# Patient Record
Sex: MALE | Race: WHITE | URBAN - METROPOLITAN AREA
[De-identification: names, ages, dates, MRNs, and addresses within clinical notes are randomized per-mention and may not be internally consistent; named-entity substitution may affect disease eponyms.]

---

## 2019-04-08 ENCOUNTER — OUTPATIENT (OUTPATIENT)
Dept: OUTPATIENT SERVICES | Facility: HOSPITAL | Age: 49
LOS: 1 days | Discharge: HOME | End: 2019-04-08

## 2019-04-08 DIAGNOSIS — N40.0 BENIGN PROSTATIC HYPERPLASIA WITHOUT LOWER URINARY TRACT SYMPTOMS: ICD-10-CM

## 2019-04-08 DIAGNOSIS — E78.00 PURE HYPERCHOLESTEROLEMIA, UNSPECIFIED: ICD-10-CM

## 2019-04-08 DIAGNOSIS — I10 ESSENTIAL (PRIMARY) HYPERTENSION: ICD-10-CM

## 2019-04-08 DIAGNOSIS — Z00.00 ENCOUNTER FOR GENERAL ADULT MEDICAL EXAMINATION WITHOUT ABNORMAL FINDINGS: ICD-10-CM

## 2020-12-22 ENCOUNTER — TRANSCRIPTION ENCOUNTER (OUTPATIENT)
Age: 50
End: 2020-12-22

## 2021-03-25 ENCOUNTER — EMERGENCY (EMERGENCY)
Facility: HOSPITAL | Age: 51
LOS: 0 days | Discharge: HOME | End: 2021-03-25
Attending: STUDENT IN AN ORGANIZED HEALTH CARE EDUCATION/TRAINING PROGRAM
Payer: COMMERCIAL

## 2021-03-25 VITALS
TEMPERATURE: 97 F | SYSTOLIC BLOOD PRESSURE: 185 MMHG | HEART RATE: 65 BPM | HEIGHT: 75 IN | RESPIRATION RATE: 20 BRPM | DIASTOLIC BLOOD PRESSURE: 101 MMHG | WEIGHT: 235.01 LBS | OXYGEN SATURATION: 97 %

## 2021-03-25 VITALS
SYSTOLIC BLOOD PRESSURE: 160 MMHG | OXYGEN SATURATION: 96 % | DIASTOLIC BLOOD PRESSURE: 99 MMHG | RESPIRATION RATE: 20 BRPM | TEMPERATURE: 98 F | HEART RATE: 62 BPM

## 2021-03-25 DIAGNOSIS — K21.9 GASTRO-ESOPHAGEAL REFLUX DISEASE WITHOUT ESOPHAGITIS: ICD-10-CM

## 2021-03-25 DIAGNOSIS — R07.89 OTHER CHEST PAIN: ICD-10-CM

## 2021-03-25 DIAGNOSIS — I10 ESSENTIAL (PRIMARY) HYPERTENSION: ICD-10-CM

## 2021-03-25 DIAGNOSIS — Z20.822 CONTACT WITH AND (SUSPECTED) EXPOSURE TO COVID-19: ICD-10-CM

## 2021-03-25 DIAGNOSIS — E78.5 HYPERLIPIDEMIA, UNSPECIFIED: ICD-10-CM

## 2021-03-25 DIAGNOSIS — R07.9 CHEST PAIN, UNSPECIFIED: ICD-10-CM

## 2021-03-25 LAB
ALBUMIN SERPL ELPH-MCNC: 4.5 G/DL — SIGNIFICANT CHANGE UP (ref 3.5–5.2)
ALP SERPL-CCNC: 90 U/L — SIGNIFICANT CHANGE UP (ref 30–115)
ALT FLD-CCNC: 41 U/L — SIGNIFICANT CHANGE UP (ref 0–41)
ANION GAP SERPL CALC-SCNC: 10 MMOL/L — SIGNIFICANT CHANGE UP (ref 7–14)
AST SERPL-CCNC: 34 U/L — SIGNIFICANT CHANGE UP (ref 0–41)
BASOPHILS # BLD AUTO: 0.03 K/UL — SIGNIFICANT CHANGE UP (ref 0–0.2)
BASOPHILS NFR BLD AUTO: 0.6 % — SIGNIFICANT CHANGE UP (ref 0–1)
BILIRUB SERPL-MCNC: 0.6 MG/DL — SIGNIFICANT CHANGE UP (ref 0.2–1.2)
BUN SERPL-MCNC: 15 MG/DL — SIGNIFICANT CHANGE UP (ref 10–20)
CALCIUM SERPL-MCNC: 9.4 MG/DL — SIGNIFICANT CHANGE UP (ref 8.5–10.1)
CHLORIDE SERPL-SCNC: 105 MMOL/L — SIGNIFICANT CHANGE UP (ref 98–110)
CO2 SERPL-SCNC: 24 MMOL/L — SIGNIFICANT CHANGE UP (ref 17–32)
CREAT SERPL-MCNC: 1.1 MG/DL — SIGNIFICANT CHANGE UP (ref 0.7–1.5)
EOSINOPHIL # BLD AUTO: 0.18 K/UL — SIGNIFICANT CHANGE UP (ref 0–0.7)
EOSINOPHIL NFR BLD AUTO: 3.5 % — SIGNIFICANT CHANGE UP (ref 0–8)
GLUCOSE SERPL-MCNC: 88 MG/DL — SIGNIFICANT CHANGE UP (ref 70–99)
HCT VFR BLD CALC: 38.3 % — LOW (ref 42–52)
HGB BLD-MCNC: 13.5 G/DL — LOW (ref 14–18)
IMM GRANULOCYTES NFR BLD AUTO: 0.2 % — SIGNIFICANT CHANGE UP (ref 0.1–0.3)
LYMPHOCYTES # BLD AUTO: 1.64 K/UL — SIGNIFICANT CHANGE UP (ref 1.2–3.4)
LYMPHOCYTES # BLD AUTO: 32 % — SIGNIFICANT CHANGE UP (ref 20.5–51.1)
MCHC RBC-ENTMCNC: 30.6 PG — SIGNIFICANT CHANGE UP (ref 27–31)
MCHC RBC-ENTMCNC: 35.2 G/DL — SIGNIFICANT CHANGE UP (ref 32–37)
MCV RBC AUTO: 86.8 FL — SIGNIFICANT CHANGE UP (ref 80–94)
MONOCYTES # BLD AUTO: 0.48 K/UL — SIGNIFICANT CHANGE UP (ref 0.1–0.6)
MONOCYTES NFR BLD AUTO: 9.4 % — HIGH (ref 1.7–9.3)
NEUTROPHILS # BLD AUTO: 2.79 K/UL — SIGNIFICANT CHANGE UP (ref 1.4–6.5)
NEUTROPHILS NFR BLD AUTO: 54.3 % — SIGNIFICANT CHANGE UP (ref 42.2–75.2)
NRBC # BLD: 0 /100 WBCS — SIGNIFICANT CHANGE UP (ref 0–0)
PLATELET # BLD AUTO: 194 K/UL — SIGNIFICANT CHANGE UP (ref 130–400)
POTASSIUM SERPL-MCNC: 4.1 MMOL/L — SIGNIFICANT CHANGE UP (ref 3.5–5)
POTASSIUM SERPL-SCNC: 4.1 MMOL/L — SIGNIFICANT CHANGE UP (ref 3.5–5)
PROT SERPL-MCNC: 7.8 G/DL — SIGNIFICANT CHANGE UP (ref 6–8)
RBC # BLD: 4.41 M/UL — LOW (ref 4.7–6.1)
RBC # FLD: 12.1 % — SIGNIFICANT CHANGE UP (ref 11.5–14.5)
SARS-COV-2 RNA SPEC QL NAA+PROBE: SIGNIFICANT CHANGE UP
SODIUM SERPL-SCNC: 139 MMOL/L — SIGNIFICANT CHANGE UP (ref 135–146)
TROPONIN T SERPL-MCNC: <0.01 NG/ML — SIGNIFICANT CHANGE UP
TROPONIN T SERPL-MCNC: <0.01 NG/ML — SIGNIFICANT CHANGE UP
WBC # BLD: 5.13 K/UL — SIGNIFICANT CHANGE UP (ref 4.8–10.8)
WBC # FLD AUTO: 5.13 K/UL — SIGNIFICANT CHANGE UP (ref 4.8–10.8)

## 2021-03-25 PROCEDURE — 75574 CT ANGIO HRT W/3D IMAGE: CPT | Mod: 26

## 2021-03-25 PROCEDURE — 93010 ELECTROCARDIOGRAM REPORT: CPT

## 2021-03-25 PROCEDURE — 99285 EMERGENCY DEPT VISIT HI MDM: CPT

## 2021-03-25 PROCEDURE — 71046 X-RAY EXAM CHEST 2 VIEWS: CPT | Mod: 26

## 2021-03-25 RX ORDER — ASPIRIN/CALCIUM CARB/MAGNESIUM 324 MG
325 TABLET ORAL ONCE
Refills: 0 | Status: COMPLETED | OUTPATIENT
Start: 2021-03-25 | End: 2021-03-25

## 2021-03-25 RX ADMIN — Medication 325 MILLIGRAM(S): at 17:22

## 2021-03-25 NOTE — ED PROVIDER NOTE - ATTENDING CONTRIBUTION TO CARE
50 yr old m w/ a pmh significant for htn, hld who presents with chest pain. Pt states that he has been having chest pain on and off for the past couple of days. Today, pt developed midsternal pain, pressure like radiating to the jaw. Pt denies any sob with the pain. pt is currently asymptomatic. pt denies any nausea, vomiting, fevers, chills or any other complaints.     VITAL SIGNS: I have reviewed nursing notes and confirm.  CONSTITUTIONAL: non-toxic, well appearing  SKIN: no rash, no petechiae.  EYES: PERRL, EOMI, pink conjunctiva, anicteric  ENT: tongue midline, no exudates, MMM  NECK: Supple; no meningismus, no JVD  CARD: RRR, no murmurs, equal radial pulses bilaterally 2+  RESP: CTAB, no respiratory distress  ABD: Soft, non-tender, non-distended, no peritoneal signs, no HSM, no CVA tenderness  EXT: Normal ROM x4. No edema. No calves tenderness  NEURO: Alert, oriented. CN2-12 intact, equal strength bilaterally, nl gait.  PSYCH: Cooperative, appropriate.    a/p  50 yr old m that presents with chest pain   -labs  -ekg  -cxr  -ccta  -dispo pending

## 2021-03-25 NOTE — ED PROVIDER NOTE - OBJECTIVE STATEMENT
51 y/o M, PMHx HTN, HLD & GERD, presents to the ED with complaints of chest discomfort x three days. Patient states that three days ago he felt episodic right sided chest discomfort and this AM he felt similar discomfort however had radiating pain along his jaw ; denies accompanying dyspnea, back pain, abdominal pain, fever, chills, nausea, vomiting and recent known sick contacts. He is not a smoker; denies FHx of CAD.

## 2021-03-25 NOTE — ED PROVIDER NOTE - CARE PROVIDERS DIRECT ADDRESSES
,DirectAddress_Unknown,[email or web address],DirectAddress_Unknown

## 2021-03-25 NOTE — ED PROVIDER NOTE - PATIENT PORTAL LINK FT
You can access the FollowMyHealth Patient Portal offered by [facility] by registering at the following website: [email or web address]. By joining [facility]’s FollowMyHealth portal, you will also be able to view your health information using other applications (apps) compatible with our system.

## 2021-03-25 NOTE — ED PROVIDER NOTE - CARE PROVIDER_API CALL
Lupe Peralta)  Cardiovascular Disease; Interventional Cardiology  [address] [place] [address]  Phone: [phone]  Fax: [phone]  Follow Up Time: Urgent    Deyvi Mcclendon)  Internal Medicine  [address] [place] [address]  Phone: [phone]  Fax: [phone]  Follow Up Time: Urgent    Sin Valero (DO)  Critical Care Medicine; Pulmonary Disease; Sleep Medicine  [address] [place] [address], [address]  [place] [address]  Phone: [phone]  Fax: [phone]  Follow Up Time: Urgent

## 2021-03-25 NOTE — CONSULT NOTE ADULT - SUBJECTIVE AND OBJECTIVE BOX
Outpt cardiologist:  Dr. Peralta    HPI:      49 y/o M, PMHx HTN, HLD & GERD, presents to the ED with complaints of chest discomfort x three days. Patient states that three days ago he felt episodic right sided chest discomfort and this AM he felt similar discomfort however had radiating pain along his jaw ; denies accompanying dyspnea, back pain, abdominal pain, fever, chills, nausea, vomiting and recent known sick contacts. He is not a smoker; denies FHx of CAD.        PAST MEDICAL & SURGICAL HISTORY  Acid reflux    HTN (hypertension)        FAMILY HISTORY:  FAMILY HISTORY:      SOCIAL HISTORY:  Social History:      ALLERGIES:  No Known Allergies      MEDICATIONS:    PRN:      HOME MEDICATIONS:  Home Medications:      VITALS:   T(F): 97.8 ( @ 16:13), Max: 97.8 ( @ 16:13)  HR: 62 ( @ 16:13) (62 - 65)  BP: 160/99 ( @ 16:13) (160/99 - 185/101)  BP(mean): --  RR: 20 ( @ 16:13) (20 - 20)  SpO2: 96% ( @ 16:13) (96% - 97%)    I&O's Summary      REVIEW OF SYSTEMS:  CONSTITUTIONAL: No weakness, fevers or chills  HEENT: No visual changes, neck/ear pain  RESPIRATORY: No cough, sob  CARDIOVASCULAR: See HPI  GASTROINTESTINAL: No abdominal pain. No nausea, vomiting, diarrhea   GENITOURINARY: No dysuria, frequency or hematuria  NEUROLOGICAL: No new focal deficits  SKIN: No new rashes    PHYSICAL EXAM:  General: Not in distress.  Non-toxic appearing.   HEENT: EOMI  Cardio: regular, S1, S2, no murmur  Pulm: B/L BS.  No wheezing / crackles / rales  Abdomen: Soft, non-tender, non-distended. Normoactive bowel sounds  Extremities: No edema b/l le  Neuro: A&O x3. No focal deficits    LABS:                        13.5   5.13  )-----------( 194      ( 25 Mar 2021 12:58 )             38.3         139  |  105  |  15  ----------------------------<  88  4.1   |  24  |  1.1    Ca    9.4      25 Mar 2021 12:58    TPro  7.8  /  Alb  4.5  /  TBili  0.6  /  DBili  x   /  AST  34  /  ALT  41  /  AlkPhos  90        Troponin T, Serum: <0.01 ng/mL (21 @ 16:45)  Troponin T, Serum: <0.01 ng/mL (21 @ 12:58)    CARDIAC MARKERS ( 25 Mar 2021 16:45 )  x     / <0.01 ng/mL / x     / x     / x      CARDIAC MARKERS ( 25 Mar 2021 12:58 )  x     / <0.01 ng/mL / x     / x     / x            Troponin trend:        COVID-19 PCR: NotDetec (25 Mar 2021 13:06)      RADIOLOGY:  -CXR:  -TTE:  -CCTA:  < from: CT Angio Heart and Coronaries w/ IV Cont (21 @ 14:50) >  Calcium Score:    Vessel              Calcium Score    =======================================================  LM:                    0  LAD:                 87  LCX:                 3  RCA:                 46  =======================================================  Total:                136      CORONARY CT ANGIOGRAM:    There is a right dominant coronary arterial system.    Left Main Artery: Patent with no evidence of plaque or stenosis.    Left Anterior Descending Artery: Scattered areas of calcified and noncalcified plaque without significant stenosis.    Left Circumflex Artery: Mixed plaque within the midsegment contributing to moderate stenosis.    Right Coronary Artery: Scattered areas of calcified plaque without significant stenosis.    CARDIAC MORPHOLOGY: The cardiac chambers are normal in size.  There is no pericardial effusion.    IMAGED AORTA: The thoracic aorta is normal in caliber.    IMAGED EXTRACARDIAC FINDINGS:  Moderate hiatal hernia.  Scattered 2-3 mm right-sided pulmonary nodules for example image 27 series 13.    IMPRESSION:    1. Mixed plaque within the mid left circumflex contributing to moderate stenosis.    Atherosclerotic disease elsewhere within the coronary arteries without significant stenosis.    The total Agatston coronary artery calcium score equals 136, which corresponds to 88th percentilefor age, gender and ethnicity.    < end of copied text >  -STRESS TEST:  -CATHETERIZATION:  -OTHER:  EC Lead ECG:   Ventricular Rate 70 BPM    Atrial Rate 70 BPM    P-R Interval 170 ms    QRS Duration 88 ms    Q-T Interval 392 ms    QTC Calculation(Bazett) 423 ms    P Axis 51 degrees    R Axis 13 degrees    T Axis 11 degrees    Diagnosis Line Normal sinus rhythm  Normal ECG    Confirmed by Robert Matthews ([id number]) on 3/25/2021 2:19:09 PM ( @ 12:23)      TELEMETRY EVENTS:

## 2021-03-25 NOTE — ED PROVIDER NOTE - CHIEF COMPLAINT
The patient is a 50y Male complaining of chest pain.
Alert-The patient is alert, awake and responds to voice. The patient is oriented to time, place, and person. The triage nurse is able to obtain subjective information.

## 2021-03-25 NOTE — ED PROVIDER NOTE - PROGRESS NOTE DETAILS
Spoke with Dr. Neal (Dr. Peralta's) partner re: CCTA result. Will repeat second troponin and he states that he will call cardiology fellow to evaluate. Dr. Neal called back to state that he spoke with Dr. Peralta who plans to perform cardiac cath on patient tomorrow. Cardio fellow aware. Will admit to telemetry. Cardiology fellow spoke with patient and again with Dr. Peralta. Patient given option for admission for cardiac cath (may not be performed until later in the day tomorrow) vs discharge home w f/u w. Dr. Peralta and scheduled elective cardiac cath. Patient chose to f/u with Dr. Peralta as an outpatient and schedule cath. Strict return precautions discussed. All labs and imaging studies reviewed with patient, including pulm. nodules, and he has been given a copy of all and advised on the importance of f/u

## 2021-03-25 NOTE — ED PROVIDER NOTE - CLINICAL SUMMARY MEDICAL DECISION MAKING FREE TEXT BOX
50 yr old m that presents with chest pain. labs, imaging, ekg obtained. pt informed of all findings. pt offered admission for cath, pt refusing. spoke to pts cardiologist who cleared pt for discharged. pt discharged with strict return precautions and pcp follow up.

## 2021-03-25 NOTE — ED PROVIDER NOTE - NSFOLLOWUPINSTRUCTIONS_ED_ALL_ED_FT

## 2021-03-25 NOTE — CONSULT NOTE ADULT - ASSESSMENT
IMPRESSION  - CAD per CCTA  - Atypical chest pain  ---  trops neg x 2  ccta with mod lcx dz.     PLAN  - ok to dc today  - start on asa, statin  - please f/u with Dr. Peralta next week   - will schedule for cardiac cath as outpt IMPRESSION  - CAD per CCTA  - Atypical chest pain  ---  trops neg x 2  ccta with mod lcx dz.     PLAN  - ok to dc today  - start on asa, statin  - please f/u with Dr. Peralta next week   - will schedule for cardiac cath as outpt    Patient discussed with  and  . He will be scheduled for cath as out patient

## 2021-03-25 NOTE — ED PROVIDER NOTE - PROVIDER TOKENS
PROVIDER:[TOKEN:[[id number]],FOLLOWUP:[Urgent]],PROVIDER:[TOKEN:[[id number]],FOLLOWUP:[Urgent]],PROVIDER:[TOKEN:[[id number]],FOLLOWUP:[Urgent]]

## 2021-04-05 PROBLEM — K21.9 GASTRO-ESOPHAGEAL REFLUX DISEASE WITHOUT ESOPHAGITIS: Chronic | Status: ACTIVE | Noted: 2021-03-25

## 2021-04-05 PROBLEM — I10 ESSENTIAL (PRIMARY) HYPERTENSION: Chronic | Status: ACTIVE | Noted: 2021-03-25

## 2021-04-16 ENCOUNTER — OUTPATIENT (OUTPATIENT)
Dept: OUTPATIENT SERVICES | Facility: HOSPITAL | Age: 51
LOS: 1 days | Discharge: HOME | End: 2021-04-16

## 2021-04-16 VITALS
WEIGHT: 240.08 LBS | SYSTOLIC BLOOD PRESSURE: 116 MMHG | OXYGEN SATURATION: 97 % | RESPIRATION RATE: 16 BRPM | TEMPERATURE: 98 F | HEIGHT: 75 IN | DIASTOLIC BLOOD PRESSURE: 85 MMHG | HEART RATE: 65 BPM

## 2021-04-16 LAB
ANION GAP SERPL CALC-SCNC: 11 MMOL/L — SIGNIFICANT CHANGE UP (ref 7–14)
BUN SERPL-MCNC: 13 MG/DL — SIGNIFICANT CHANGE UP (ref 10–20)
CALCIUM SERPL-MCNC: 9.4 MG/DL — SIGNIFICANT CHANGE UP (ref 8.5–10.1)
CHLORIDE SERPL-SCNC: 103 MMOL/L — SIGNIFICANT CHANGE UP (ref 98–110)
CO2 SERPL-SCNC: 22 MMOL/L — SIGNIFICANT CHANGE UP (ref 17–32)
CREAT SERPL-MCNC: 1.2 MG/DL — SIGNIFICANT CHANGE UP (ref 0.7–1.5)
GLUCOSE SERPL-MCNC: 90 MG/DL — SIGNIFICANT CHANGE UP (ref 70–99)
HCT VFR BLD CALC: 39.1 % — LOW (ref 42–52)
HGB BLD-MCNC: 13.5 G/DL — LOW (ref 14–18)
MCHC RBC-ENTMCNC: 30.1 PG — SIGNIFICANT CHANGE UP (ref 27–31)
MCHC RBC-ENTMCNC: 34.5 G/DL — SIGNIFICANT CHANGE UP (ref 32–37)
MCV RBC AUTO: 87.3 FL — SIGNIFICANT CHANGE UP (ref 80–94)
NRBC # BLD: 0 /100 WBCS — SIGNIFICANT CHANGE UP (ref 0–0)
PLATELET # BLD AUTO: 194 K/UL — SIGNIFICANT CHANGE UP (ref 130–400)
POTASSIUM SERPL-MCNC: 4.4 MMOL/L — SIGNIFICANT CHANGE UP (ref 3.5–5)
POTASSIUM SERPL-SCNC: 4.4 MMOL/L — SIGNIFICANT CHANGE UP (ref 3.5–5)
RBC # BLD: 4.48 M/UL — LOW (ref 4.7–6.1)
RBC # FLD: 12.1 % — SIGNIFICANT CHANGE UP (ref 11.5–14.5)
SODIUM SERPL-SCNC: 136 MMOL/L — SIGNIFICANT CHANGE UP (ref 135–146)
WBC # BLD: 4.32 K/UL — LOW (ref 4.8–10.8)
WBC # FLD AUTO: 4.32 K/UL — LOW (ref 4.8–10.8)

## 2021-04-16 RX ORDER — ESOMEPRAZOLE MAGNESIUM 40 MG/1
1 CAPSULE, DELAYED RELEASE ORAL
Qty: 0 | Refills: 0 | DISCHARGE

## 2021-04-16 RX ORDER — LISINOPRIL 2.5 MG/1
1 TABLET ORAL
Qty: 0 | Refills: 0 | DISCHARGE

## 2021-04-16 RX ORDER — ROSUVASTATIN CALCIUM 5 MG/1
1 TABLET ORAL
Qty: 0 | Refills: 0 | DISCHARGE

## 2021-04-16 NOTE — H&P CARDIOLOGY - HISTORY OF PRESENT ILLNESS
Patient is a 50y Male PMH: HTN, HLD, GERD, pt was recently in ED for chest pain, had abnormal CCTA revealing LCX moderate stenosis, Ca score 136, CAD RADS 3, [facility] recommended        Vital Signs Last 24 Hrs  T(C): --  T(F): --  HR: --  BP: --  BP(mean): --  RR: --  SpO2: --    Pre cath note:  indication:  [ ] STEMI                [ ] NSTEMI                 [ ] Acute coronary syndrome                   [ ]Unstable Angina   [ ] high risk  [ ] intermediate risk  [ ] low risk                   [x ] Stable Angina     non-invasive testing:      CCTA                    Date:        3/25/21             result: [ ] high risk  [ x] intermediate risk  [ ] low risk    Anti- Anginal medications:                    [ ] not used                       [ ] used                   [ ] not used but strong indication not to use    Ejection Fraction                   [ ] <29            [ ] 30-39%   [ ] 40-49%     [ ]>50%    CHF                   [ ] active (within last 14 days on meds   [ ] Chronic (on meds but no exacerbation)    COPD                   [ ] mild (on chronic bronchodilators)  [ ] moderate (on chronic steroid therapy)      [ ] severe (indication for home O2 or PACO2 >50)    Other risk factors:                     [ ] Previous MI                     [ ] CVA/ stroke                    [ ] carotid stent/ CEA                    [ ] PVD/PAD- (arterial aneurysm, non-palpable pulses, tortuous vessel with inability to insert catheter, infra-renal dissection, renal or subclavian artery stenosis)                    [ ] diabetic                    [ ] previous CABG                    [ ] Renal Failure     Bleeding Risk: 0.7%      REVIEW OF SYSTEMS:  CONSTITUTIONAL: No fever, weight loss, or fatigue  CARDIOLOGY: PAtient denies chest pain, shortness of breath or syncopal episodes.   RESPIRATORY: denies shortness of breath, wheezing  NEUROLOGICAL: NO weakness, no focal deficits to report.  ENDOCRINOLOGICAL: no recent change in diabetic medications.   GI: no BRBPR, no N,V,diarrhea.     PHYSICAL EXAM:  · CONSTITUTIONAL:	Well-developed, well nourished    ·RESPIRATORY:   airway patent; breath sounds equal; good air movement; respirations non-labored; clear to auscultation bilaterally; no chest wall tenderness; no intercostal retractions; no rales,rhonchi or wheeze  · CARDIOVASCULAR	regular rate and rhythm  no rub  no murmur  normal PMI  · EXTREMITIES: No cyanosis, clubbing or edema  · VASCULAR: 	Equal and normal pulses (carotid, femoral, dorsalis pedis)  	        EKG: SR Patient is a 50y Male PMH: HTN, HLD, GERD, pt was recently in ED for chest pain, had abnormal CCTA revealing LCX moderate stenosis, Ca score 136, CAD RADS 3, [facility] recommended        Vital Signs Last 24 Hrs  T(C): --  T(F): --  HR: --  BP: --116/85  BP(mean): --97  RR: --  SpO2: --    Pre cath note:  indication:  [ ] STEMI                [ ] NSTEMI                 [ ] Acute coronary syndrome                   [ ]Unstable Angina   [ ] high risk  [ ] intermediate risk  [ ] low risk                   [x ] Stable Angina     non-invasive testing:      CCTA                    Date:        3/25/21             result: [ ] high risk  [ x] intermediate risk  [ ] low risk    EXAM:  CT ANGIO HEART CORONARY IC            PROCEDURE DATE:  03/25/2021            INTERPRETATION:  CLINICAL INDICATION: Chest pain.    COMPARISON: None.    TECHNIQUE: Unenhanced axial ECG-triggered CT was obtained through the chest. CT angiography of the heart was then performed utilizing ECG-gated imaging. 3-D reconstruction images were obtained.    CONTRAST:  70 cc of Optiray 320    INTERPRETATION:    Calcium Score:    Vessel              Calcium Score    =======================================================  LM:                    0  LAD:                 87  LCX:                 3  RCA:                 46  =======================================================  Total:                136      CORONARY CT ANGIOGRAM:    There is a right dominant coronary arterial system.    Left Main Artery: Patent with no evidence of plaque or stenosis.    Left Anterior Descending Artery: Scattered areas of calcified and noncalcified plaque without significant stenosis.    Left Circumflex Artery: Mixed plaque within the midsegment contributing to moderate stenosis.    Right Coronary Artery: Scattered areas of calcified plaque without significant stenosis.    CARDIAC MORPHOLOGY: The cardiac chambers are normal in size.  There is no pericardial effusion.    IMAGED AORTA: The thoracic aorta is normal in caliber.    IMAGED EXTRACARDIAC FINDINGS:  Moderate hiatal hernia.  Scattered 2-3 mm right-sided pulmonary nodules for example image 27 series 13.    IMPRESSION:    1. Mixed plaque within the mid left circumflex contributing to moderate stenosis.    Atherosclerotic disease elsewhere within the coronary arteries without significant stenosis.    The total Agatston coronary artery calcium score equals 136, which corresponds to 88th percentilefor age, gender and ethnicity.    CAD-RADS 3.      2. Scattered 2-3 mm right-sided pulmonary nodules. CT of the chest in 12 months may be obtained for follow-up.              Anti- Anginal medications:                    [x ] not used                       [ ] used                   [ ] not used but strong indication not to use    Ejection Fraction                   [ ] <29            [ ] 30-39%   [ ] 40-49%     [ ]>50%    CHF                   [ ] active (within last 14 days on meds   [ ] Chronic (on meds but no exacerbation)    COPD                   [ ] mild (on chronic bronchodilators)  [ ] moderate (on chronic steroid therapy)      [ ] severe (indication for home O2 or PACO2 >50)    Other risk factors:                     [ ] Previous MI                     [ ] CVA/ stroke                    [ ] carotid stent/ CEA                    [ ] PVD/PAD- (arterial aneurysm, non-palpable pulses, tortuous vessel with inability to insert catheter, infra-renal dissection, renal or subclavian artery stenosis)                    [ ] diabetic                    [ ] previous CABG                    [ ] Renal Failure     Bleeding Risk: 0.7%      REVIEW OF SYSTEMS:  CONSTITUTIONAL: No fever, weight loss, or fatigue  CARDIOLOGY: PAtient denies chest pain, shortness of breath or syncopal episodes.   RESPIRATORY: denies shortness of breath, wheezing  NEUROLOGICAL: NO weakness, no focal deficits to report.  ENDOCRINOLOGICAL: no recent change in diabetic medications.   GI: no BRBPR, no N,V,diarrhea.     PHYSICAL EXAM:  · CONSTITUTIONAL:	Well-developed, well nourished    ·RESPIRATORY:   airway patent; breath sounds equal; good air movement; respirations non-labored; clear to auscultation bilaterally; no chest wall tenderness; no intercostal retractions; no rales,rhonchi or wheeze  · CARDIOVASCULAR	regular rate and rhythm  no rub  no murmur  normal PMI  · EXTREMITIES: No cyanosis, clubbing or edema  · VASCULAR: 	Equal and normal pulses (carotid, femoral, dorsalis pedis)  	        EKG: SR

## 2021-04-16 NOTE — CHART NOTE - NSCHARTNOTEFT_GEN_A_CORE
PRE-OP DIAGNOSIS: suspected CAD/abnormal CCTA    PROCEDURE: [facility] with coronary angiography    Physician: JILL Neal  Assistant: SUNNI South    ANESTHESIA TYPE:  [ x ] Sedation  [ x ] Local/Regional    ESTIMATED BLOOD LOSS:    10   mL    CONDITION  [ x ]Good    SPECIMENS REMOVED (IF APPLICABLE): None    IV CONTRAST:   65    mL    FINDINGS    Left Heart Catheterization:  Non-obstructive CAD  Right radial 6 Fr - radial D stat     There was no angiographic evidence for occlusive coronary artery disease. Left main: Normal. The vessel was medium sized. LAD: The vessel was medium sized. Proximal LAD: Angiography showed minor luminal irregularities with no flow limiting lesions. Mid LAD: Angiography showed minor luminal irregularities with no flow limiting lesions. Distal LAD: Angiography showed minor luminal irregularities with no flow limiting lesions. 1st diagonal: Angiography showed minor luminal irregularities with no flow limiting lesions. Circumflex: The vessel was medium sized. Proximal circumflex: Angiography showed minor luminal irregularities with no flow limiting lesions. Distal circumflex: Distal circumflex: The vessel was small sized. Angiography showed minor luminal irregularities with no flow limiting lesions. 1st obtuse marginal: The vessel was medium sized. Angiography showed minor luminal irregularities with no flow limiting lesions. 2nd obtuse marginal: There was a discrete 60 % stenosis. RCA: The vessel was medium sized. Proximal RCA: Angiography showed minor luminal irregularities with no flow limiting lesions. Mid RCA: Angiography showed minor luminal irregularities with no flow limiting lesions. Distal RCA: Angiography showed minor luminal irregularities with no flow limiting lesions. Right PDA: Angiography showed minor luminal irregularities with no flow limiting lesions. Right posterolateral segment: Angiography showed minor luminal irregularities with no flow limiting lesions.     INTERVENTION  iFR of OM2 (0.99)    POST-OP DIAGNOSIS  Non-obstructive CAD    PLAN OF CARE  [ x] D/C Home today  C/w medical Rx   cc/hr for 4 hrs

## 2021-04-27 PROBLEM — E78.49 OTHER HYPERLIPIDEMIA: Chronic | Status: ACTIVE | Noted: 2021-04-16

## 2021-04-29 DIAGNOSIS — E78.5 HYPERLIPIDEMIA, UNSPECIFIED: ICD-10-CM

## 2021-04-29 DIAGNOSIS — K21.9 GASTRO-ESOPHAGEAL REFLUX DISEASE WITHOUT ESOPHAGITIS: ICD-10-CM

## 2021-04-29 DIAGNOSIS — I10 ESSENTIAL (PRIMARY) HYPERTENSION: ICD-10-CM

## 2021-04-29 DIAGNOSIS — Z79.82 LONG TERM (CURRENT) USE OF ASPIRIN: ICD-10-CM

## 2021-04-29 DIAGNOSIS — I25.10 ATHEROSCLEROTIC HEART DISEASE OF NATIVE CORONARY ARTERY WITHOUT ANGINA PECTORIS: ICD-10-CM

## 2021-04-29 DIAGNOSIS — I20.8 OTHER FORMS OF ANGINA PECTORIS: ICD-10-CM

## 2021-05-04 ENCOUNTER — OUTPATIENT (OUTPATIENT)
Dept: OUTPATIENT SERVICES | Facility: HOSPITAL | Age: 51
LOS: 1 days | Discharge: HOME | End: 2021-05-04
Payer: COMMERCIAL

## 2021-05-04 DIAGNOSIS — R91.8 OTHER NONSPECIFIC ABNORMAL FINDING OF LUNG FIELD: ICD-10-CM

## 2021-05-04 DIAGNOSIS — R91.1 SOLITARY PULMONARY NODULE: ICD-10-CM

## 2021-05-04 DIAGNOSIS — R07.89 OTHER CHEST PAIN: ICD-10-CM

## 2021-05-04 PROCEDURE — 71250 CT THORAX DX C-: CPT | Mod: 26

## 2022-06-04 ENCOUNTER — NON-APPOINTMENT (OUTPATIENT)
Age: 52
End: 2022-06-04

## 2022-10-26 ENCOUNTER — APPOINTMENT (OUTPATIENT)
Dept: PULMONOLOGY | Facility: CLINIC | Age: 52
End: 2022-10-26

## 2022-10-26 VITALS
HEIGHT: 75 IN | RESPIRATION RATE: 14 BRPM | HEART RATE: 87 BPM | OXYGEN SATURATION: 97 % | WEIGHT: 241 LBS | BODY MASS INDEX: 29.97 KG/M2 | SYSTOLIC BLOOD PRESSURE: 104 MMHG | DIASTOLIC BLOOD PRESSURE: 64 MMHG

## 2022-10-26 DIAGNOSIS — Z86.79 PERSONAL HISTORY OF OTHER DISEASES OF THE CIRCULATORY SYSTEM: ICD-10-CM

## 2022-10-26 DIAGNOSIS — R91.8 OTHER NONSPECIFIC ABNORMAL FINDING OF LUNG FIELD: ICD-10-CM

## 2022-10-26 DIAGNOSIS — Z86.39 PERSONAL HISTORY OF OTHER ENDOCRINE, NUTRITIONAL AND METABOLIC DISEASE: ICD-10-CM

## 2022-10-26 PROBLEM — Z00.00 ENCOUNTER FOR PREVENTIVE HEALTH EXAMINATION: Status: ACTIVE | Noted: 2022-10-26

## 2022-10-26 PROCEDURE — 99203 OFFICE O/P NEW LOW 30 MIN: CPT

## 2022-10-26 NOTE — REASON FOR VISIT
[Initial] : an initial visit [Abnormal CXR/ Chest CT] : an abnormal CXR/ chest CT [Pulmonary Nodules] : pulmonary nodules [TextBox_[id number]] : The patient was seen in [facility] in May 2021.  He was having some chest pains at that point.  He had a work-up that failed to reveal any dramatic cardiac abnormalities.  This included a CAT scan of the chest.  The CAT scan showed multiple tiny nodules in the 2 to 4 mm range.  Suggestion was made by radiology to follow this up in a year.  As an outpatient he saw a pulmonologist who had suggested the same routine CAT scan in 1 year.  His pulmonologist has since retired.\par \par The patient has been a never smoker.  He does not have any significant secondhand smoke.  He does not remember any severe systemic diseases.  He does do a lot of  work in his home.

## 2022-10-26 NOTE — ASSESSMENT
[FreeTextEntry1] : Patient has multiple pulmonary nodules.  He is a never smoker.  They are likely related to a postinflammatory process.  It is unclear the origin.\par \par I will arrange for the patient to have a repeat CAT scan at this time.  I will discuss the findings on the results become available.  If there is no significant change I would not pursue the nodules further as the likely tiny areas of scar tissue.\par \par I discussed this with the patient in detail today in the office.

## 2022-11-06 ENCOUNTER — OUTPATIENT (OUTPATIENT)
Dept: OUTPATIENT SERVICES | Facility: HOSPITAL | Age: 52
LOS: 1 days | Discharge: HOME | End: 2022-11-06

## 2022-11-06 ENCOUNTER — RESULT REVIEW (OUTPATIENT)
Age: 52
End: 2022-11-06

## 2022-11-06 DIAGNOSIS — R91.1 SOLITARY PULMONARY NODULE: ICD-10-CM

## 2022-11-06 PROCEDURE — 71250 CT THORAX DX C-: CPT | Mod: 26

## 2022-11-09 ENCOUNTER — NON-APPOINTMENT (OUTPATIENT)
Age: 52
End: 2022-11-09

## 2023-11-28 ENCOUNTER — NON-APPOINTMENT (OUTPATIENT)
Age: 53
End: 2023-11-28

## 2023-12-11 ENCOUNTER — OUTPATIENT (OUTPATIENT)
Dept: OUTPATIENT SERVICES | Facility: HOSPITAL | Age: 53
LOS: 1 days | End: 2023-12-11
Payer: COMMERCIAL

## 2023-12-11 ENCOUNTER — RESULT REVIEW (OUTPATIENT)
Age: 53
End: 2023-12-11

## 2023-12-11 DIAGNOSIS — R91.1 SOLITARY PULMONARY NODULE: ICD-10-CM

## 2023-12-11 PROCEDURE — 71250 CT THORAX DX C-: CPT | Mod: 26

## 2023-12-11 PROCEDURE — 71250 CT THORAX DX C-: CPT

## 2023-12-12 DIAGNOSIS — R91.1 SOLITARY PULMONARY NODULE: ICD-10-CM

## 2023-12-21 ENCOUNTER — NON-APPOINTMENT (OUTPATIENT)
Age: 53
End: 2023-12-21

## 2024-10-09 ENCOUNTER — OUTPATIENT (OUTPATIENT)
Dept: OUTPATIENT SERVICES | Facility: HOSPITAL | Age: 54
LOS: 1 days | End: 2024-10-09
Payer: COMMERCIAL

## 2024-10-09 DIAGNOSIS — M79.672 PAIN IN LEFT FOOT: ICD-10-CM

## 2024-10-09 DIAGNOSIS — Z00.8 ENCOUNTER FOR OTHER GENERAL EXAMINATION: ICD-10-CM

## 2024-10-09 PROCEDURE — 76882 US LMTD JT/FCL EVL NVASC XTR: CPT | Mod: 26,LT

## 2024-10-09 PROCEDURE — 76882 US LMTD JT/FCL EVL NVASC XTR: CPT | Mod: LT

## 2024-10-10 DIAGNOSIS — M79.672 PAIN IN LEFT FOOT: ICD-10-CM
